# Patient Record
Sex: FEMALE | Race: WHITE | Employment: OTHER | URBAN - METROPOLITAN AREA
[De-identification: names, ages, dates, MRNs, and addresses within clinical notes are randomized per-mention and may not be internally consistent; named-entity substitution may affect disease eponyms.]

---

## 2017-04-06 PROBLEM — I83.93 VARICOSE VEINS OF BOTH LOWER EXTREMITIES: Status: ACTIVE | Noted: 2017-04-06

## 2018-08-16 ENCOUNTER — HOSPITAL ENCOUNTER (OUTPATIENT)
Dept: CT IMAGING | Facility: CLINIC | Age: 68
Discharge: HOME | End: 2018-08-18
Payer: MEDICARE

## 2018-08-16 ENCOUNTER — HOSPITAL ENCOUNTER (OUTPATIENT)
Facility: CLINIC | Age: 68
Discharge: HOME | End: 2018-08-16
Payer: MEDICARE

## 2018-08-16 DIAGNOSIS — R19.8 CHANGE IN BOWEL MOVEMENT: ICD-10-CM

## 2018-08-16 DIAGNOSIS — R10.32 COLICKY LLQ ABDOMINAL PAIN: ICD-10-CM

## 2018-08-16 DIAGNOSIS — Z01.812 PRE-PROCEDURAL LABORATORY EXAMINATIONS: ICD-10-CM

## 2018-08-16 LAB
BUN BLDV-MCNC: 11 MG/DL (ref 8–23)
CREAT SERPL-MCNC: 0.5 MG/DL (ref 0.5–0.9)
GFR AFRICAN AMERICAN: >60 ML/MIN
GFR NON-AFRICAN AMERICAN: >60 ML/MIN
GFR SERPL CREATININE-BSD FRML MDRD: NORMAL ML/MIN/{1.73_M2}
GFR SERPL CREATININE-BSD FRML MDRD: NORMAL ML/MIN/{1.73_M2}

## 2018-08-16 PROCEDURE — 74177 CT ABD & PELVIS W/CONTRAST: CPT

## 2018-08-16 PROCEDURE — 84520 ASSAY OF UREA NITROGEN: CPT

## 2018-08-16 PROCEDURE — 6360000004 HC RX CONTRAST MEDICATION: Performed by: FAMILY MEDICINE

## 2018-08-16 PROCEDURE — 82565 ASSAY OF CREATININE: CPT

## 2018-08-16 PROCEDURE — 2580000003 HC RX 258: Performed by: FAMILY MEDICINE

## 2018-08-16 RX ORDER — SODIUM CHLORIDE 0.9 % (FLUSH) 0.9 %
10 SYRINGE (ML) INJECTION PRN
Status: DISCONTINUED | OUTPATIENT
Start: 2018-08-16 | End: 2018-08-19 | Stop reason: HOSPADM

## 2018-08-16 RX ORDER — 0.9 % SODIUM CHLORIDE 0.9 %
70 INTRAVENOUS SOLUTION INTRAVENOUS ONCE
Status: COMPLETED | OUTPATIENT
Start: 2018-08-16 | End: 2018-08-16

## 2018-08-16 RX ADMIN — Medication 10 ML: at 15:35

## 2018-08-16 RX ADMIN — IOHEXOL 50 ML: 240 INJECTION, SOLUTION INTRATHECAL; INTRAVASCULAR; INTRAVENOUS; ORAL at 14:05

## 2018-08-16 RX ADMIN — SODIUM CHLORIDE 70 ML: 9 INJECTION, SOLUTION INTRAVENOUS at 15:34

## 2018-08-16 RX ADMIN — IOPAMIDOL 75 ML: 755 INJECTION, SOLUTION INTRAVENOUS at 15:33

## 2018-09-02 ENCOUNTER — HOSPITAL ENCOUNTER (EMERGENCY)
Facility: CLINIC | Age: 68
Discharge: HOME | End: 2018-09-02
Attending: EMERGENCY MEDICINE
Payer: OTHER MISCELLANEOUS

## 2018-09-02 VITALS
SYSTOLIC BLOOD PRESSURE: 139 MMHG | WEIGHT: 145 LBS | DIASTOLIC BLOOD PRESSURE: 61 MMHG | OXYGEN SATURATION: 99 % | HEART RATE: 57 BPM | HEIGHT: 65 IN | BODY MASS INDEX: 24.16 KG/M2 | RESPIRATION RATE: 21 BRPM | TEMPERATURE: 97.8 F
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
## 2018-09-02 PROCEDURE — 99283 EMERGENCY DEPT VISIT LOW MDM: CPT

## 2018-09-02 RX ORDER — CYCLOBENZAPRINE HCL 10 MG
10 TABLET ORAL 3 TIMES DAILY PRN
Qty: 20 TABLET | Refills: 0 | Status: SHIPPED | OUTPATIENT
Start: 2018-09-02 | End: 2018-09-12

## 2018-09-02 RX ORDER — IBUPROFEN 600 MG/1
600 TABLET ORAL EVERY 8 HOURS PRN
Qty: 20 TABLET | Refills: 0 | Status: SHIPPED | OUTPATIENT
Start: 2018-09-02 | End: 2018-12-10 | Stop reason: ALTCHOICE

## 2018-09-02 ASSESSMENT — ENCOUNTER SYMPTOMS
BACK PAIN: 0
DIARRHEA: 0
EYE PAIN: 0
NAUSEA: 0
ABDOMINAL PAIN: 0
VOMITING: 0
BLOOD IN STOOL: 0
COUGH: 0
CONSTIPATION: 0
SHORTNESS OF BREATH: 0

## 2018-09-02 ASSESSMENT — PAIN DESCRIPTION - LOCATION: LOCATION: NECK

## 2018-09-02 ASSESSMENT — PAIN DESCRIPTION - PROGRESSION: CLINICAL_PROGRESSION: NOT CHANGED

## 2018-09-02 ASSESSMENT — PAIN DESCRIPTION - ONSET: ONSET: SUDDEN

## 2018-09-02 ASSESSMENT — PAIN SCALES - GENERAL: PAINLEVEL_OUTOF10: 3

## 2018-09-02 ASSESSMENT — PAIN DESCRIPTION - ORIENTATION: ORIENTATION: ANTERIOR

## 2018-09-02 ASSESSMENT — PAIN DESCRIPTION - DESCRIPTORS: DESCRIPTORS: TIGHTNESS

## 2018-09-02 ASSESSMENT — PAIN DESCRIPTION - FREQUENCY: FREQUENCY: CONTINUOUS

## 2018-09-02 ASSESSMENT — PAIN DESCRIPTION - PAIN TYPE: TYPE: ACUTE PAIN
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
## 2018-10-16 ENCOUNTER — HOSPITAL ENCOUNTER (OUTPATIENT)
Dept: MAMMOGRAPHY | Facility: CLINIC | Age: 68
Discharge: HOME | End: 2018-10-18
Payer: MEDICARE

## 2018-10-16 DIAGNOSIS — Z12.31 ENCOUNTER FOR SCREENING MAMMOGRAM FOR MALIGNANT NEOPLASM OF BREAST: ICD-10-CM

## 2018-10-16 DIAGNOSIS — Z78.0 ASYMPTOMATIC MENOPAUSAL STATE: ICD-10-CM

## 2018-10-16 PROCEDURE — 77080 DXA BONE DENSITY AXIAL: CPT

## 2018-10-16 PROCEDURE — 77067 SCR MAMMO BI INCL CAD: CPT

## 2019-11-25 PROBLEM — M81.0 AGE-RELATED OSTEOPOROSIS WITHOUT CURRENT PATHOLOGICAL FRACTURE: Status: ACTIVE | Noted: 2019-11-25

## 2020-12-22 ENCOUNTER — OFFICE VISIT (OUTPATIENT)
Dept: PRIMARY CARE CLINIC | Age: 70
End: 2020-12-22
Payer: MEDICARE

## 2020-12-22 ENCOUNTER — HOSPITAL ENCOUNTER (OUTPATIENT)
Age: 70
Setting detail: SPECIMEN
Discharge: HOME | End: 2020-12-22
Payer: MEDICARE

## 2020-12-22 VITALS
SYSTOLIC BLOOD PRESSURE: 129 MMHG | BODY MASS INDEX: 21.66 KG/M2 | DIASTOLIC BLOOD PRESSURE: 71 MMHG | HEIGHT: 65 IN | WEIGHT: 130 LBS | HEART RATE: 70 BPM | TEMPERATURE: 98 F | OXYGEN SATURATION: 99 %

## 2020-12-22 PROCEDURE — 1036F TOBACCO NON-USER: CPT | Performed by: INTERNAL MEDICINE

## 2020-12-22 PROCEDURE — 1090F PRES/ABSN URINE INCON ASSESS: CPT | Performed by: INTERNAL MEDICINE

## 2020-12-22 PROCEDURE — 4040F PNEUMOC VAC/ADMIN/RCVD: CPT | Performed by: INTERNAL MEDICINE

## 2020-12-22 PROCEDURE — G8399 PT W/DXA RESULTS DOCUMENT: HCPCS | Performed by: INTERNAL MEDICINE

## 2020-12-22 PROCEDURE — G8420 CALC BMI NORM PARAMETERS: HCPCS | Performed by: INTERNAL MEDICINE

## 2020-12-22 PROCEDURE — 99202 OFFICE O/P NEW SF 15 MIN: CPT | Performed by: INTERNAL MEDICINE

## 2020-12-22 PROCEDURE — 1123F ACP DISCUSS/DSCN MKR DOCD: CPT | Performed by: INTERNAL MEDICINE

## 2020-12-22 PROCEDURE — 3017F COLORECTAL CA SCREEN DOC REV: CPT | Performed by: INTERNAL MEDICINE

## 2020-12-22 PROCEDURE — G8427 DOCREV CUR MEDS BY ELIG CLIN: HCPCS | Performed by: INTERNAL MEDICINE

## 2020-12-22 PROCEDURE — G8484 FLU IMMUNIZE NO ADMIN: HCPCS | Performed by: INTERNAL MEDICINE

## 2020-12-22 ASSESSMENT — ENCOUNTER SYMPTOMS: FLU SYMPTOMS: 1

## 2020-12-22 NOTE — PROGRESS NOTES
[address] [address]  Dept: [phone]  Dept Fax: [phone]    Kush Garcia is a 79 y.o. female who presents to the urgent care today for her medicalconditions/complaints as noted below. Kush Garcia is c/o of Headache (onset 4 days, temple area off and on), Sinus Problem (onset 4 days), Fatigue (onset 4 days), Nasal Congestion (onset 4 days runny nose and post nasal drainage), and Cough (onset 4 days, worse at night)      HPI:     Influenza  This is a new problem. The current episode started in the past 7 days. The problem occurs constantly. The problem has been unchanged. Associated symptoms include congestion and headaches. Nothing aggravates the symptoms. She has tried nothing for the symptoms. The treatment provided no relief. Past Medical History:   Diagnosis Date    Carpal tunnel syndrome     Recurrent herpes simplex virus (HSV) infection of buttock     Varicose veins of both lower extremities 4/6/2017        Current Outpatient Medications   Medication Sig Dispense Refill    alendronate (FOSAMAX) 70 MG tablet Take 1 tablet by mouth every 7 days 12 tablet 3    valACYclovir (VALTREX) 1 g tablet TAKE 1 TABLET BY MOUTH TWICE A DAY FOR 3 DAYS FOR OUTBREAK (Patient not taking: Reported on 12/22/2020) 24 tablet 1     No current facility-administered medications for this visit.       No Known Allergies    Health Maintenance   Topic Date Due    Breast cancer screen  10/16/2020    Lipid screen  02/26/2021    DTaP/Tdap/Td vaccine (3 - Td) 10/08/2021    Colon cancer screen colonoscopy  09/19/2028    DEXA (modify frequency per FRAX score)  Completed    Flu vaccine  Completed    Shingles Vaccine  Completed    Pneumococcal 65+ years Vaccine  Completed    Hepatitis C screen  Completed    Hepatitis A vaccine  Aged Out    Hepatitis B vaccine  Aged Out    Hib vaccine  Aged Out    Meningococcal (ACWY) vaccine  Aged Out Subjective:      Review of Systems   HENT: Positive for congestion. Neurological: Positive for headaches. All other systems reviewed and are negative. Objective:     Physical Exam  Constitutional:       Appearance: Normal appearance. HENT:      Head: Normocephalic and atraumatic. Skin:     General: Skin is warm and dry. Neurological:      General: No focal deficit present. Mental Status: She is alert and oriented to person, place, and time. Psychiatric:         Mood and Affect: Mood normal.         Behavior: Behavior normal.       /71 (Site: Left Upper Arm, Position: Sitting, Cuff Size: Medium Adult)   Pulse 70   Temp 98 °F (36.7 °C) (Oral)   Ht 5' 5\" (1.651 m)   Wt 130 lb (59 kg)   SpO2 99%   BMI 21.63 kg/m²       Assessment:       Diagnosis Orders   1. Flu-like symptoms  COVID-19 Ambulatory       Plan:      No follow-ups on file. No orders of the defined types were placed in this encounter. Orders Placed This Encounter   Procedures    COVID-19 Ambulatory     Standing Status:   Future     Standing Expiration Date:   12/22/2021     Scheduling Instructions:      Saline media preferred given current shortage of viral transport media but both acceptable     Order Specific Question:   Is this test for diagnosis or screening? Answer:   Diagnosis of ill patient     Order Specific Question:   Symptomatic for COVID-19 as defined by CDC? Answer:   Yes     Order Specific Question:   Date of Symptom Onset     Answer:   12/14/2020     Order Specific Question:   Hospitalized for COVID-19? Answer:   No     Order Specific Question:   Admitted to ICU for COVID-19? Answer:   No     Order Specific Question:   Employed in healthcare setting? Answer:   No     Order Specific Question:   Resident in a congregate (group) care setting? Answer:   No     Order Specific Question:   Pregnant?      Answer:   No            Patient given educational materials - see patient instructions. Discussed use, benefit, and side effects of prescribed medications. All patientquestions answered. Pt voiced understanding.     Electronically signed by Ayanna Cai MD on 12/22/2020at 10:39 AM

## 2020-12-25 LAB — SARS-COV-2, NAA: DETECTED

## 2020-12-26 ENCOUNTER — TELEPHONE (OUTPATIENT)
Dept: PRIMARY CARE CLINIC | Age: 70
End: 2020-12-26

## 2021-01-13 ENCOUNTER — HOSPITAL ENCOUNTER (OUTPATIENT)
Dept: MAMMOGRAPHY | Facility: CLINIC | Age: 71
Discharge: HOME | End: 2021-01-15
Payer: MEDICARE

## 2021-01-13 DIAGNOSIS — M81.0 OSTEOPOROSIS, UNSPECIFIED OSTEOPOROSIS TYPE, UNSPECIFIED PATHOLOGICAL FRACTURE PRESENCE: ICD-10-CM

## 2021-01-13 DIAGNOSIS — Z12.31 ENCOUNTER FOR SCREENING MAMMOGRAM FOR BREAST CANCER: ICD-10-CM

## 2021-01-13 PROCEDURE — 77067 SCR MAMMO BI INCL CAD: CPT

## 2021-01-13 PROCEDURE — 77080 DXA BONE DENSITY AXIAL: CPT

## 2022-11-10 ENCOUNTER — APPOINTMENT (OUTPATIENT)
Dept: GENERAL RADIOLOGY | Facility: CLINIC | Age: 72
End: 2022-11-10
Payer: MEDICARE

## 2022-11-10 ENCOUNTER — HOSPITAL ENCOUNTER (EMERGENCY)
Facility: CLINIC | Age: 72
Discharge: HOME | End: 2022-11-10
Attending: EMERGENCY MEDICINE
Payer: MEDICARE

## 2022-11-10 VITALS
HEART RATE: 65 BPM | SYSTOLIC BLOOD PRESSURE: 153 MMHG | WEIGHT: 140 LBS | TEMPERATURE: 97.9 F | HEIGHT: 65 IN | OXYGEN SATURATION: 100 % | BODY MASS INDEX: 23.32 KG/M2 | RESPIRATION RATE: 16 BRPM | DIASTOLIC BLOOD PRESSURE: 71 MMHG

## 2022-11-10 DIAGNOSIS — S62.112A CLOSED CHIP FRACTURE OF TRIQUETRUM OF LEFT WRIST, INITIAL ENCOUNTER: Primary | ICD-10-CM

## 2022-11-10 PROCEDURE — 99283 EMERGENCY DEPT VISIT LOW MDM: CPT

## 2022-11-10 PROCEDURE — 73130 X-RAY EXAM OF HAND: CPT

## 2022-11-10 PROCEDURE — 73110 X-RAY EXAM OF WRIST: CPT

## 2022-11-10 ASSESSMENT — ENCOUNTER SYMPTOMS
BACK PAIN: 0
COLOR CHANGE: 1

## 2022-11-10 ASSESSMENT — PAIN DESCRIPTION - ORIENTATION: ORIENTATION: LEFT

## 2022-11-10 ASSESSMENT — PAIN SCALES - GENERAL: PAINLEVEL_OUTOF10: 6

## 2022-11-10 ASSESSMENT — PAIN DESCRIPTION - DESCRIPTORS: DESCRIPTORS: SORE

## 2022-11-10 ASSESSMENT — PAIN DESCRIPTION - ONSET: ONSET: SUDDEN

## 2022-11-10 ASSESSMENT — PAIN DESCRIPTION - PAIN TYPE: TYPE: ACUTE PAIN

## 2022-11-10 ASSESSMENT — PAIN DESCRIPTION - FREQUENCY: FREQUENCY: CONTINUOUS

## 2022-11-10 ASSESSMENT — PAIN DESCRIPTION - LOCATION: LOCATION: WRIST

## 2022-11-10 NOTE — DISCHARGE INSTRUCTIONS
PLEASE RETURN TO THE EMERGENCY DEPARTMENT IMMEDIATELY if your symptoms worsen in anyway or in 1-2 days if not improved for re-evaluation. You should immediately return to the ER for symptoms such as new or worsening pain, fever, numbness or weakness to the arms or legs, coolness or color change of the arms or legs. It is recommended that you keep your splint on until seen by your family doctor or an orthopedist.      Take your medication as indicated and prescribed. If you are given an antibiotic then, make sure you get the prescription filled and take the antibiotics until finished. Please understand that at this time there is no evidence for a more serious underlying process, but that early in the process of an illness or injury, an emergency department workup can be falsely reassuring. You should contact your family doctor within the next 48 hours for a follow up appointment    Vale Lechuga!!!    From [facility] and [facility] Emergency Services    On behalf of the Emergency Department staff at [facility]), I would like to thank you for giving us the opportunity to address your health care needs and concerns. We hope that during your visit, our service was delivered in a professional and caring manner. Please keep [facility] in mind as we walk with you down the path to your own personal wellness. Please expect an automated text message or email from us so we can ask a few questions about your health and progress. Based on your answers, a clinician may call you back to offer help and instructions. Please understand that early in the process of an illness or injury, an emergency department workup can be falsely reassuring. If you notice any worsening, changing or persistent symptoms please call your family doctor or return to the ER immediately. Tell us how we did during your visit at [email or web address]   and let us know about your experience

## 2022-11-10 NOTE — ED PROVIDER NOTES
Attending Supervisory Note/Shared Visit   I have personally performed a face to face diagnostic evaluation on this patient. I have reviewed the mid-levels findings and agree. History and Exam by me shows an alert and oriented patient seen with extender I agree with the assessment treatment plan and disposition  I discussed the findings patient she has a triquetrium avulsion fracture of her nondominant wrist.  Which is closed.     (Please note that portions of this note were completed with a voice recognition program.  Efforts were made to edit the dictations but occasionally words are mis-transcribed.)    Cindy Bagley MD  Attending Emergency Physician       Cindy Bagley MD  11/10/22 6140

## 2022-11-10 NOTE — ED PROVIDER NOTES
[facility]  [address] [facility]  Phone: [phone]        Pt Name: Mariam Warren  MRN: [id number]  [place] 1950  Date of evaluation: 11/10/22    CHIEFCOMPLAINT       Chief Complaint   Patient presents with    Fall    Wrist Injury     Left. Denies LOC or blood thinners. Denies hitting head or other injury. HISTORY OF PRESENT ILLNESS (Location/Symptom, Timing/Onset, Context/Setting, Quality, Duration, Modifying Factors, Severity)      Mariam Warren is a 67 y.o. female with no pertinent PMH who presents to the ED via private auto with left wrist pain. Patient states approximate 1030 this morning she tripped over her dog and fell onto her wrist.  She denies in her head or any loss of consciousness. She denies any neck or back pain. She does not take any new medications for symptoms. She states that nothing makes her symptoms better or worse. She does report some bruising and does have an ice pack applied on arrival.    PAST MEDICAL / SURGICAL / SOCIAL / FAMILY HISTORY     PMH:  has a past medical history of Carpal tunnel syndrome, Recurrent herpes simplex virus (HSV) infection of buttock, and Varicose veins of both lower extremities. Surgical History:  has a past surgical history that includes Carpal tunnel release (Right) and Vein Surgery. Social History:  reports that she quit smoking about 45 years ago. Her smoking use included cigarettes. She has a 0.20 pack-year smoking history. She has never used smokeless tobacco. She reports current alcohol use. She reports that she does not use drugs. Family History: She indicated that her mother is . She indicated that her father is . She indicated that her sister is . She indicated that her brother is . family history includes Diabetes in her father and mother; Heart Disease in her brother; Hypertension in her father;  Other (age of onset: 27) in her sister; Prostate Cancer in her brother. Psychiatric History: None    Allergies: Patient has no known allergies. Home Medications:   Prior to Admission medications    Medication Sig Start Date End Date Taking? Authorizing Provider   valACYclovir (VALTREX) 1 g tablet TAKE 1 TABLET BY MOUTH TWICE A DAY FOR 3 DAYS FOR OUTBREAK 7/26/21   Jaxson Trujillo, DO       REVIEW OF SYSTEMS  (2-9 systems for level 4, 10 ormore for level 5)      Review of Systems   Musculoskeletal:  Positive for joint swelling. Negative for back pain, neck pain and neck stiffness. Positive left wrist pain   Skin:  Positive for color change. Negative for wound. Neurological:  Negative for weakness and numbness. All other systems negative except as marked. PHYSICAL EXAM  (up to 7 for level 4, 8 or more for level 5)      INITIAL VITALS:  height is 5' 5\" (1.651 m) and weight is 63.5 kg (140 lb). Her oral temperature is 97.9 °F (36.6 °C). Her blood pressure is 153/71 (abnormal) and her pulse is 65. Her respiration is 16 and oxygen saturation is 100%. Vital signs reviewed. Physical Exam  Constitutional:       General: She is not in acute distress. Appearance: Normal appearance. She is not ill-appearing or toxic-appearing. HENT:      Head: Normocephalic and atraumatic. Cardiovascular:      Pulses:           Radial pulses are 2+ on the left side. Musculoskeletal:      Right elbow: Normal.      Left elbow: Normal.      Right forearm: Normal.      Left forearm: Normal.      Right wrist: Normal.      Left wrist: Swelling present. No tenderness, bony tenderness or snuff box tenderness. Normal range of motion. Normal pulse. Right hand: Normal. Normal capillary refill. Normal pulse. Left hand: Normal. Normal capillary refill. Normal pulse. Cervical back: Neck supple. No tenderness. Comments: Patients is neurovascularly intact. Able to give OK sign, thumbs, up, and spread fingers against resistance bilaterally.  Sensation intact to palmar aspect of index finger, webbing between fingers, and pinky finger bilaterally. Bilateral radial pulses 2+. No tenderness to the left hand or wrist.  There is ecchymosis to the dorsum of left wrist.  Range of motion is intact. Skin:     General: Skin is warm and dry. Capillary Refill: Capillary refill takes less than 2 seconds. Neurological:      Mental Status: She is alert. Psychiatric:         Mood and Affect: Mood normal.         Behavior: Behavior normal.         DIFFERENTIAL DIAGNOSIS / MDM     Plan obtain x-ray of left wrist and hand to rule out any bony abnormalities. Ice pack was applied on arrival.  X-rays show a probable triquetral avulsion fracture. Will place patient in a wrist splint and referred to orthopedics. Attending discharged this patient after discussing all labwork/imaging results that were finalized. Treatment plan and recommended follow-up discussed with them as well. PLAN (LABS / IMAGING / EKG):  Orders Placed This Encounter   Procedures    XR WRIST LEFT (MIN 3 VIEWS)    XR HAND LEFT (MIN 3 VIEWS)    [facility] Wrist Brace, Left       MEDICATIONS ORDERED:  No orders of the defined types were placed in this encounter. Controlled Substances Monitoring:     DIAGNOSTIC RESULTS     EKG: All EKG's are interpreted by the Emergency Department Physician who either signs or Co-signs this chart in the absenceof a cardiologist.        RADIOLOGY: All images are read by the radiologist and their interpretations are reviewed. XR WRIST LEFT (MIN 3 VIEWS)   Final Result   Probable avulsion fracture triquetrum         XR HAND LEFT (MIN 3 VIEWS)   Final Result   Probable triquetral avulsion fracture. No results found. LABS:  No results found for this visit on 11/10/22.     EMERGENCY DEPARTMENT COURSE           Vitals:    Vitals:    11/10/22 1549   BP: (!) 153/71   Pulse: 65   Resp: 16   Temp: 97.9 °F (36.6 °C)   TempSrc: Oral   SpO2: 100%   Weight: 63.5 kg (140 lb)   Height: 5' 5\" (1.651 m)     -------------------------  BP: (!) 153/71, Temp: 97.9 °F (36.6 °C), Heart Rate: 65, Resp: 16      RE-EVALUATION:  See ED Course notes above. CONSULTS:  None    PROCEDURES:  None    FINAL IMPRESSION      1. Closed chip fracture of triquetrum of left wrist, initial encounter          DISPOSITION / PLAN     CONDITION ON DISPOSITION:   Stable for discharge.      PATIENT REFERRED TO:  Luis Fernando Laguna, [address]  [address] [address] [phone]    Call in 1 day      [facility]  [address] [facility] [address]  [phone]    If symptoms worsen    Bee MD Annel  [address] [facility] [address] [phone]    Call in 1 day      DISCHARGE MEDICATIONS:  New Prescriptions    No medications on file       LAW Bustos - [place]   Emergency Medicine Nurse Practitioner    (Please note that portions of this note were completed with a voice recognition program.  Efforts were made to edit the dictations but occasionally words aremis-transcribed.)       LAW Bustos - CNP  11/10/22 2471

## 2022-11-14 ENCOUNTER — OFFICE VISIT (OUTPATIENT)
Dept: ORTHOPEDIC SURGERY | Age: 72
End: 2022-11-14
Payer: MEDICARE

## 2022-11-14 VITALS — HEIGHT: 65 IN | RESPIRATION RATE: 12 BRPM | BODY MASS INDEX: 23.32 KG/M2 | WEIGHT: 140 LBS

## 2022-11-14 DIAGNOSIS — S62.112A CLOSED CHIP FRACTURE OF TRIQUETRUM OF LEFT WRIST, INITIAL ENCOUNTER: Primary | ICD-10-CM

## 2022-11-14 PROCEDURE — G8427 DOCREV CUR MEDS BY ELIG CLIN: HCPCS | Performed by: PHYSICIAN ASSISTANT

## 2022-11-14 PROCEDURE — 99203 OFFICE O/P NEW LOW 30 MIN: CPT | Performed by: PHYSICIAN ASSISTANT

## 2022-11-14 PROCEDURE — 1090F PRES/ABSN URINE INCON ASSESS: CPT | Performed by: PHYSICIAN ASSISTANT

## 2022-11-14 PROCEDURE — G8399 PT W/DXA RESULTS DOCUMENT: HCPCS | Performed by: PHYSICIAN ASSISTANT

## 2022-11-14 PROCEDURE — 3017F COLORECTAL CA SCREEN DOC REV: CPT | Performed by: PHYSICIAN ASSISTANT

## 2022-11-14 PROCEDURE — 1123F ACP DISCUSS/DSCN MKR DOCD: CPT | Performed by: PHYSICIAN ASSISTANT

## 2022-11-14 PROCEDURE — G8484 FLU IMMUNIZE NO ADMIN: HCPCS | Performed by: PHYSICIAN ASSISTANT

## 2022-11-14 PROCEDURE — G8420 CALC BMI NORM PARAMETERS: HCPCS | Performed by: PHYSICIAN ASSISTANT

## 2022-11-14 PROCEDURE — 1036F TOBACCO NON-USER: CPT | Performed by: PHYSICIAN ASSISTANT

## 2022-11-14 ASSESSMENT — ENCOUNTER SYMPTOMS
RESPIRATORY NEGATIVE: 1
ABDOMINAL DISTENTION: 0
ABDOMINAL PAIN: 0
VOMITING: 0
COLOR CHANGE: 0
CHEST TIGHTNESS: 0
CONSTIPATION: 0
DIARRHEA: 0
COUGH: 0
NAUSEA: 0
APNEA: 0
SHORTNESS OF BREATH: 0

## 2022-11-14 NOTE — PROGRESS NOTES
[address] [place] [address] [facility]  [address] [place] [id number]  Dept: [phone]  Dept Fax: [phone]        Fracture Follow Up      Subjective:     Chief Complaint   Patient presents with    Wrist Pain     L wrist fx. ED f/u 11/11/22     HPI:     Initial visit:     Ky Ceballos is a 67y.o. year old right hand dominant female who presents to our office today regarding her left wrist fracture. The injury was caused by a fall over her dog. The date of injury was on 11/10/2022. Therefore, we are 4 days post injury. Patient went to [facility] ED for initial treatment which included x-ray and was given wrist brace. Patient has tried ibuprofen for the pain. She has osteopenia. ROS:     Review of Systems   Constitutional:  Positive for activity change. Negative for appetite change, fatigue and fever. Respiratory: Negative. Negative for apnea, cough, chest tightness and shortness of breath. Cardiovascular: Negative. Negative for chest pain, palpitations and leg swelling. Gastrointestinal:  Negative for abdominal distention, abdominal pain, constipation, diarrhea, nausea and vomiting. Genitourinary:  Negative for difficulty urinating, dysuria and hematuria. Musculoskeletal:  Positive for arthralgias. Negative for gait problem, joint swelling and myalgias. Skin:  Negative for color change and rash. Neurological:  Negative for dizziness, weakness, numbness and headaches. Psychiatric/Behavioral:  Negative for sleep disturbance. I have reviewed the CC, HPI, ROS, PMH, FHX, Social History, and if not present in this note, I have reviewed in the patient's chart. I agree with the documentation provided by other staff and have reviewed their documentation prior to providing my signature indicating agreement.   Vitals:   Resp 12   Ht 5' 5\" (1.651 m)   Wt 140 lb (63.5 kg)   BMI 23.30 kg/m²  Body mass index is 23.3 kg/m². Physical Examination:     Orthopedics:    GENERAL: Alert and oriented X3 in no acute distress. SKIN: Intact without lesions or ulcerations. Positive ecchymosis  NEURO: Musculoskeletal and axillary nerves intact to sensory and motor testing. VASC: Capillary refill is less than 3 seconds. Fracture:    LOCATION: Left wrist  SITE: Distal neurocirculatory status is intact. EXAM: Sensation is intact to light touch, there is full motor function of the extremity. PALP: fracture site is palpated with severe pain. TFCC pain to palpation  ROM: Painful range of motion  Assessment:     1. Closed chip fracture of triquetrum of left wrist, initial encounter      Procedures:    Procedure: no  Radiology:   XR WRIST LEFT (MIN 3 VIEWS)    Result Date: 11/10/2022  EXAMINATION: 3  XRAY VIEWS OF THE LEFT WRIST 11/10/2022 4:06 pm COMPARISON: None. HISTORY: ORDERING SYSTEM PROVIDED HISTORY: pain TECHNOLOGIST PROVIDED HISTORY: pain Reason for Exam: fall left wrist pain, top of wrist bruising FINDINGS: Prominent triquetral osteophyte or fragment on lateral projection dorsally. Linear cortical lucency suspected. Cortical margins otherwise intact. Carpal bones in normal alignment. Probable avulsion fracture triquetrum     XR HAND LEFT (MIN 3 VIEWS)    Result Date: 11/10/2022  EXAMINATION: THREE XRAY VIEWS OF THE LEFT HAND 11/10/2022 4:06 pm COMPARISON: None. HISTORY: ORDERING SYSTEM PROVIDED HISTORY: pain TECHNOLOGIST PROVIDED HISTORY: pain Reason for Exam: fall left wrist pain , bruising top of wrist FINDINGS: Probable triquetral avulsion fracture. Otherwise negative hand. Probable triquetral avulsion fracture. Plan:   Fracture Treatment : I reviewed the x-ray of the hand and the wrist with the patient and I informed them that the x-ray shows a probable triquetral avulsion fracture.  We discussed the etiologies and natural histories of triquetral avulsion fracture of the left wrist. We discussed the various treatment alternatives including anti-inflammatory medications, physical therapy, injections, further imaging studies and as a last result surgery. During today's visit, we discussed that she has a triquetrum fracture which does not require any type of surgery. She should wear the brace for the next 6 weeks but she can come into some gentle exercises. The patient will follow-up with me in 4 weeks with pre-clinic x-rays of her left wrist. The patient will call the office immediately with any problems. No orders of the defined types were placed in this encounter. No orders of the defined types were placed in this encounter. This note is created with the assistance of a speech recognition program.  While intending to generate a document that actually reflects the content of the visit, the document can still have some errors including those of syntax and sound a like substitutions which may escape proof reading.   In such instances, actual meaning can be extrapolated by contextual diversion     Electronically signed by Alpesh Watkins PA-C, on 11/14/2022 at 11:34 AM

## 2022-12-12 DIAGNOSIS — S62.112A CLOSED CHIP FRACTURE OF TRIQUETRUM OF LEFT WRIST, INITIAL ENCOUNTER: Primary | ICD-10-CM

## 2022-12-14 ENCOUNTER — OFFICE VISIT (OUTPATIENT)
Dept: ORTHOPEDIC SURGERY | Age: 72
End: 2022-12-14
Payer: MEDICARE

## 2022-12-14 VITALS — WEIGHT: 140 LBS | BODY MASS INDEX: 23.32 KG/M2 | HEIGHT: 65 IN | RESPIRATION RATE: 12 BRPM

## 2022-12-14 DIAGNOSIS — S62.112D CLOSED CHIP FRACTURE OF LEFT TRIQUETRUM WITH ROUTINE HEALING, SUBSEQUENT ENCOUNTER: Primary | ICD-10-CM

## 2022-12-14 PROCEDURE — 1123F ACP DISCUSS/DSCN MKR DOCD: CPT | Performed by: PHYSICIAN ASSISTANT

## 2022-12-14 PROCEDURE — 1090F PRES/ABSN URINE INCON ASSESS: CPT | Performed by: PHYSICIAN ASSISTANT

## 2022-12-14 PROCEDURE — G8427 DOCREV CUR MEDS BY ELIG CLIN: HCPCS | Performed by: PHYSICIAN ASSISTANT

## 2022-12-14 PROCEDURE — 99212 OFFICE O/P EST SF 10 MIN: CPT | Performed by: PHYSICIAN ASSISTANT

## 2022-12-14 PROCEDURE — 3017F COLORECTAL CA SCREEN DOC REV: CPT | Performed by: PHYSICIAN ASSISTANT

## 2022-12-14 PROCEDURE — G8420 CALC BMI NORM PARAMETERS: HCPCS | Performed by: PHYSICIAN ASSISTANT

## 2022-12-14 PROCEDURE — G8484 FLU IMMUNIZE NO ADMIN: HCPCS | Performed by: PHYSICIAN ASSISTANT

## 2022-12-14 PROCEDURE — 1036F TOBACCO NON-USER: CPT | Performed by: PHYSICIAN ASSISTANT

## 2022-12-14 PROCEDURE — G8399 PT W/DXA RESULTS DOCUMENT: HCPCS | Performed by: PHYSICIAN ASSISTANT

## 2022-12-14 ASSESSMENT — ENCOUNTER SYMPTOMS
ABDOMINAL PAIN: 0
COUGH: 0
RESPIRATORY NEGATIVE: 1
DIARRHEA: 0
ABDOMINAL DISTENTION: 0
CONSTIPATION: 0
APNEA: 0
COLOR CHANGE: 0
SHORTNESS OF BREATH: 0
VOMITING: 0
NAUSEA: 0
CHEST TIGHTNESS: 0

## 2022-12-14 NOTE — PROGRESS NOTES
[address] AND SPORTS MEDICINE  [address]  [facility] [place] [address]  Dept: [phone]  Dept Fax: [phone]        Fracture follow up      Subjective:     Chief Complaint   Patient presents with    Wrist Pain     L wrist fx doi 11/11/22     HPI:   Follow up visit:     Rahel Baca is a 67y.o. year old female who presents to our office today for a followup regarding her left wrist fracture. The date of injury was on 11/10/2022. Therefore, we are 6 weeks post injury. Patient has tried nothing for the pain. She states that the pain is much improved. She does periodically put the splint on but overall she does not need it most of the time. Certain things do not bother her make it ache once in a while. ROS:     Review of Systems   Constitutional:  Positive for activity change. Negative for appetite change, fatigue and fever. Respiratory: Negative. Negative for apnea, cough, chest tightness and shortness of breath. Cardiovascular: Negative. Negative for chest pain, palpitations and leg swelling. Gastrointestinal:  Negative for abdominal distention, abdominal pain, constipation, diarrhea, nausea and vomiting. Genitourinary:  Negative for difficulty urinating, dysuria and hematuria. Musculoskeletal:  Positive for arthralgias. Negative for gait problem, joint swelling and myalgias. Skin:  Negative for color change and rash. Neurological:  Negative for dizziness, weakness, numbness and headaches. Psychiatric/Behavioral:  Negative for sleep disturbance. I have reviewed the CC, HPI, ROS, PMH, FHX, Social History, and if not present in this note, I have reviewed in the patient's chart. I agree with the documentation provided by other staff and have reviewed their documentation prior to providing my signature indicating agreement.   Vitals:   Resp 12   Ht 5' 5\" (1.651 m)   Wt 140 lb (63.5 kg) BMI 23.30 kg/m²  Body mass index is 23.3 kg/m². Physical Examination:     Orthopedics:    GENERAL: Alert and oriented X3 in no acute distress. SKIN: Intact without lesions or ulcerations. NEURO: Musculoskeletal and axillary nerves intact to sensory and motor testing. VASC: Capillary refill is less than 3 seconds. Fracture:    LOCATION: Left wrist  SITE: Distal neurocirculatory status is intact. EXAM: Sensation is intact to light touch, there is full motor function of the extremity. PALP: fracture site is palpated with minimal pain. ROM: 45 degrees of flexion, 45 of extension, 90 degrees of supination and 90 degrees pronation  Assessment:     1. Closed chip fracture of left triquetrum with routine healing, subsequent encounter      Procedures:    Procedure: no  Radiology:   3 views of the left wrist including AP, lateral and oblique views reveal triquetral fracture fragment on the lateral projection dorsally. Lateral cortical lucency noted. Cortical margins otherwise intact. Carpal bones are normal in alignment. Impression: Avulsion fracture of the triquetrum of the left wrist.    Plan:   Fracture Treatment : I reviewed the x-ray with the patient and I informed them that the x-ray does not show a lot of healing but the patient is feeling much better. We discussed the etiologies and natural histories of triquetral fracture of the left wrist. We discussed the various treatment alternatives including anti-inflammatory medications, physical therapy, injections, further imaging studies and as a last result surgery. During today's visit, we discussed that this fracture may never heal all the way back to bone but she will continue to feel better over time. There are no restrictions to her wrist at this time. She does not need to wear the brace anymore. I am going to include some exercises for the stiffness to help with that.  The patient has opted for discontinuing her brace and using the exercises to help with stiffness. Even if she does something painful, she is not injuring the fracture. The patient will follow-up with me in 6 weeks with pre-clinic x-rays. The patient will call the office immediately with any problems. No orders of the defined types were placed in this encounter. No orders of the defined types were placed in this encounter. This note is created with the assistance of a speech recognition program.  While intending to generate a document that actually reflects the content of the visit, the document can still have some errors including those of syntax and sound a like substitutions which may escape proof reading.   In such instances, actual meaning can be extrapolated by contextual diversion     Electronically signed by [place] PA-C, on 12/14/2022 at 9:09 AM

## 2023-01-23 DIAGNOSIS — S62.112D CLOSED CHIP FRACTURE OF LEFT TRIQUETRUM WITH ROUTINE HEALING, SUBSEQUENT ENCOUNTER: Primary | ICD-10-CM

## 2023-02-02 ENCOUNTER — OFFICE VISIT (OUTPATIENT)
Dept: ORTHOPEDIC SURGERY | Age: 73
End: 2023-02-02
Payer: MEDICARE

## 2023-02-02 VITALS — RESPIRATION RATE: 12 BRPM | HEIGHT: 65 IN | BODY MASS INDEX: 23.32 KG/M2 | WEIGHT: 140 LBS

## 2023-02-02 DIAGNOSIS — S62.112D CLOSED CHIP FRACTURE OF LEFT TRIQUETRUM WITH ROUTINE HEALING, SUBSEQUENT ENCOUNTER: Primary | ICD-10-CM

## 2023-02-02 PROCEDURE — 3017F COLORECTAL CA SCREEN DOC REV: CPT | Performed by: PHYSICIAN ASSISTANT

## 2023-02-02 PROCEDURE — G8399 PT W/DXA RESULTS DOCUMENT: HCPCS | Performed by: PHYSICIAN ASSISTANT

## 2023-02-02 PROCEDURE — 1123F ACP DISCUSS/DSCN MKR DOCD: CPT | Performed by: PHYSICIAN ASSISTANT

## 2023-02-02 PROCEDURE — G8484 FLU IMMUNIZE NO ADMIN: HCPCS | Performed by: PHYSICIAN ASSISTANT

## 2023-02-02 PROCEDURE — G8420 CALC BMI NORM PARAMETERS: HCPCS | Performed by: PHYSICIAN ASSISTANT

## 2023-02-02 PROCEDURE — 1036F TOBACCO NON-USER: CPT | Performed by: PHYSICIAN ASSISTANT

## 2023-02-02 PROCEDURE — 99212 OFFICE O/P EST SF 10 MIN: CPT | Performed by: PHYSICIAN ASSISTANT

## 2023-02-02 PROCEDURE — G8427 DOCREV CUR MEDS BY ELIG CLIN: HCPCS | Performed by: PHYSICIAN ASSISTANT

## 2023-02-02 PROCEDURE — 1090F PRES/ABSN URINE INCON ASSESS: CPT | Performed by: PHYSICIAN ASSISTANT

## 2023-02-02 ASSESSMENT — ENCOUNTER SYMPTOMS
VOMITING: 0
DIARRHEA: 0
NAUSEA: 0
CONSTIPATION: 0
ABDOMINAL DISTENTION: 0
SHORTNESS OF BREATH: 0
CHEST TIGHTNESS: 0
APNEA: 0
COLOR CHANGE: 0
RESPIRATORY NEGATIVE: 1
COUGH: 0
ABDOMINAL PAIN: 0

## 2023-02-02 NOTE — PROGRESS NOTES
[address]  AND SPORTS MEDICINE  [address]  [facility] [address]  Dept: [phone]  Dept Fax: [phone]        Fracture follow-up      Subjective:     Chief Complaint   Patient presents with    Wrist Pain     L wrist doi 11/11/22        HPI:     Follow up visit:     Ganesh Matta is a 67y.o. year old female who presents to our office today for a followup regarding her left wrist fracture. The date of injury was on 11/10/2022. Therefore, we are 12 weeks post injury. She states that her wrist is excellent and she is having no pain. She can do everything that she would like to do. ROS:     Review of Systems   Constitutional:  Positive for activity change. Negative for appetite change, fatigue and fever. Respiratory: Negative. Negative for apnea, cough, chest tightness and shortness of breath. Cardiovascular: Negative. Negative for chest pain, palpitations and leg swelling. Gastrointestinal:  Negative for abdominal distention, abdominal pain, constipation, diarrhea, nausea and vomiting. Genitourinary:  Negative for difficulty urinating, dysuria and hematuria. Musculoskeletal:  Negative for arthralgias, gait problem, joint swelling and myalgias. Skin:  Negative for color change and rash. Neurological:  Negative for dizziness, weakness, numbness and headaches. Psychiatric/Behavioral:  Negative for sleep disturbance. I have reviewed the CC, HPI, ROS, PMH, FHX, Social History, and if not present in this note, I have reviewed in the patient's chart. I agree with the documentation provided by other staff and have reviewed their documentation prior to providing my signature indicating agreement. Vitals:   Resp 12   Ht 5' 5\" (1.651 m)   Wt 140 lb (63.5 kg)   BMI 23.30 kg/m²  Body mass index is 23.3 kg/m².   Physical Examination:     Orthopedics:     GENERAL: Alert and oriented X3 in no acute distress. SKIN: Intact without lesions or ulcerations. NEURO: Musculoskeletal and axillary nerves intact to sensory and motor testing. VASC: Capillary refill is less than 3 seconds. Fracture:     LOCATION: Left wrist  SITE: Distal neurocirculatory status is intact. EXAM: Sensation is intact to light touch, there is full motor function of the extremity. PALP: fracture site is palpated with minimal pain. ROM: 45 degrees of flexion, 45 of extension, 90 degrees of supination and 90 degrees pronation  Assessment:     1. Closed chip fracture of left triquetrum with routine healing, subsequent encounter      Procedures:    Procedure: no  Radiology:   3 views of the left wrist including AP, lateral and oblique views reveal triquetral fracture fragment on the lateral projection dorsally. Lateral cortical lucency noted. Cortical margins otherwise intact. Carpal bones are normal in alignment. Progressive healing since previous x-ray on 12/14/2022. Impression: Avulsion fracture of the triquetrum of the left wrist.  Plan:   Fracture Treatment : I reviewed the X-ray with the patient and I informed them that the x-ray does show progressive healing. We discussed the etiologies and natural histories of left avulsion fracture of the triquetrum. We discussed the various treatment alternatives including anti-inflammatory medications, physical therapy, injections, further imaging studies and as a last result surgery. During today's visit, we discussed that her fracture is healing very nicely. She has really no pain. I did explain to her that after her fracture there can always be some swelling and tinges of pain periodically especially if you overuse. I be surprised if that happens as much with this fracture but it could. The patient will call if she has any questions or concerns. We did discuss her bone density and her last DEXA scan showed osteopenia and had improved from osteoporosis with Fosamax.   I did tell her she should follow-up with her PCP since it may be time for a new DEXA scan since it is a little over 2 years old.  The patient will call the office immediately with any problems.      No orders of the defined types were placed in this encounter.      No orders of the defined types were placed in this encounter.      This note is created with the assistance of a speech recognition program.  While intending to generate a document that actually reflects the content of the visit, the document can still have some errors including those of syntax and sound a like substitutions which may escape proof reading.  In such instances, actual meaning can be extrapolated by contextual diversion     Electronically signed by Linh Avina PA-C, on 2/2/2023 at 9:21 AM

## 2023-07-28 ENCOUNTER — HOSPITAL ENCOUNTER (OUTPATIENT)
Dept: GENERAL RADIOLOGY | Facility: CLINIC | Age: 73
End: 2023-07-28
Payer: MEDICARE

## 2023-07-28 DIAGNOSIS — M25.552 LEFT HIP PAIN: ICD-10-CM

## 2023-07-28 PROCEDURE — 73502 X-RAY EXAM HIP UNI 2-3 VIEWS: CPT

## 2023-08-24 ENCOUNTER — HOSPITAL ENCOUNTER (OUTPATIENT)
Dept: GENERAL RADIOLOGY | Facility: CLINIC | Age: 73
Discharge: HOME | End: 2023-08-26
Payer: MEDICARE

## 2023-08-24 DIAGNOSIS — R05.1 ACUTE COUGH: ICD-10-CM

## 2023-08-24 DIAGNOSIS — R07.81 RIB PAIN ON LEFT SIDE: ICD-10-CM

## 2023-08-24 PROCEDURE — 71046 X-RAY EXAM CHEST 2 VIEWS: CPT

## 2023-09-01 ENCOUNTER — HOSPITAL ENCOUNTER (OUTPATIENT)
Age: 73
Setting detail: SPECIMEN
Discharge: HOME | End: 2023-09-01

## 2023-09-01 DIAGNOSIS — R53.83 FATIGUE, UNSPECIFIED TYPE: ICD-10-CM

## 2023-09-01 LAB
ALBUMIN SERPL-MCNC: 4.4 G/DL (ref 3.5–5.2)
ALBUMIN/GLOB SERPL: 1.5 {RATIO} (ref 1–2.5)
ALP SERPL-CCNC: 84 U/L (ref 35–104)
ALT SERPL-CCNC: 22 U/L (ref 5–33)
ANION GAP SERPL CALCULATED.3IONS-SCNC: 12 MMOL/L (ref 9–17)
AST SERPL-CCNC: 29 U/L
BASOPHILS # BLD: 0.04 K/UL (ref 0–0.2)
BASOPHILS NFR BLD: 1 % (ref 0–2)
BILIRUB SERPL-MCNC: 0.3 MG/DL (ref 0.3–1.2)
BUN SERPL-MCNC: 17 MG/DL (ref 8–23)
CALCIUM SERPL-MCNC: 9.9 MG/DL (ref 8.6–10.4)
CHLORIDE SERPL-SCNC: 104 MMOL/L (ref 98–107)
CO2 SERPL-SCNC: 25 MMOL/L (ref 20–31)
CREAT SERPL-MCNC: 0.6 MG/DL (ref 0.5–0.9)
EOSINOPHIL # BLD: 0.26 K/UL (ref 0–0.44)
EOSINOPHILS RELATIVE PERCENT: 5 % (ref 1–4)
ERYTHROCYTE [DISTWIDTH] IN BLOOD BY AUTOMATED COUNT: 13.2 % (ref 11.8–14.4)
GFR SERPL CREATININE-BSD FRML MDRD: >60 ML/MIN/1.73M2
GLUCOSE P FAST SERPL-MCNC: 101 MG/DL (ref 70–99)
HCT VFR BLD AUTO: 41.9 % (ref 36.3–47.1)
HGB BLD-MCNC: 13.9 G/DL (ref 11.9–15.1)
IMM GRANULOCYTES # BLD AUTO: <0.03 K/UL (ref 0–0.3)
IMM GRANULOCYTES NFR BLD: 0 %
LYMPHOCYTES NFR BLD: 2.14 K/UL (ref 1.1–3.7)
LYMPHOCYTES RELATIVE PERCENT: 37 % (ref 24–43)
MCH RBC QN AUTO: 29.3 PG (ref 25.2–33.5)
MCHC RBC AUTO-ENTMCNC: 33.2 G/DL (ref 28.4–34.8)
MCV RBC AUTO: 88.2 FL (ref 82.6–102.9)
MONOCYTES NFR BLD: 0.52 K/UL (ref 0.1–1.2)
MONOCYTES NFR BLD: 9 % (ref 3–12)
NEUTROPHILS NFR BLD: 48 % (ref 36–65)
NEUTS SEG NFR BLD: 2.83 K/UL (ref 1.5–8.1)
NRBC BLD-RTO: 0 PER 100 WBC
PLATELET # BLD AUTO: 300 K/UL (ref 138–453)
PMV BLD AUTO: 9.7 FL (ref 8.1–13.5)
POTASSIUM SERPL-SCNC: 4.6 MMOL/L (ref 3.7–5.3)
PROT SERPL-MCNC: 7.4 G/DL (ref 6.4–8.3)
RBC # BLD AUTO: 4.75 M/UL (ref 3.95–5.11)
SODIUM SERPL-SCNC: 141 MMOL/L (ref 135–144)
TSH SERPL DL<=0.05 MIU/L-ACNC: 3.34 UIU/ML (ref 0.3–5)
WBC OTHER # BLD: 5.8 K/UL (ref 3.5–11.3)

## 2023-09-06 LAB
EST. AVERAGE GLUCOSE BLD GHB EST-MCNC: 105 MG/DL
HBA1C MFR BLD: 5.3 % (ref 4–6)

## 2023-09-20 ENCOUNTER — HOSPITAL ENCOUNTER (OUTPATIENT)
Dept: MRI IMAGING | Facility: CLINIC | Age: 73
Discharge: HOME | End: 2023-09-22
Payer: MEDICARE

## 2023-09-20 DIAGNOSIS — R93.7 ABNORMAL X-RAY OF BONE: ICD-10-CM

## 2023-09-20 DIAGNOSIS — M25.552 LEFT HIP PAIN: ICD-10-CM

## 2023-09-20 PROCEDURE — 73721 MRI JNT OF LWR EXTRE W/O DYE: CPT

## 2023-09-25 NOTE — PROGRESS NOTES
[address] [facility] SPORTS MEDICINE  [address] [place] [address]  Dept: [phone]  Dept Fax: [phone]        Left Hip Office Visit    Subjective:   No chief complaint on file. HPI:     Negrito Aggarwal presents with a *** history of pain in the {Left/right:64385} hip. The pain {DOES/DOES NOT:07010} radiate into the thigh and into the groin. The pain is {Blank multiple:57583::\"not present\",\"present\"} over the lateral aspect of the hip. The pain is most troublesome during ambulatory activity and now limits the patient`s walking distance to ***. Night pain, which disturbs the patient`s sleep {IS/IS NOT:04118} a problem. The patient has had to give up some activities such as ***, which has produced a consequent deterioration in quality of life. she has tried *** and reduction of activity and reports {Desc; no/some/marked:63128} improvement. Back pain is {Blank multiple:69379::\"not present\",\"present\"} but is tolerable and {DOES/DOES NOT:16949} not interfere with the patient`s life as does the hip. The patient {HAS HAS DVQ:14628} had a cortisone injection. The patient {HAS HAS KTL:46857} tried physical therapy. The patient {HAS HAS YWK:27222} had surgery. The opposite hip {IS/IS HNN:05275} okay. Knee pain is {MINIMALLY/MODERATELY/MARKEDLY HIP FRACTURE HPI MD:60986} noted. Patient had an MRI of her left hip completed.     ROS:     Review of Systems    Past Medical History:    Past Medical History:   Diagnosis Date    Carpal tunnel syndrome     Recurrent herpes simplex virus (HSV) infection of buttock     Varicose veins of both lower extremities 4/6/2017       Past Surgical History:    Past Surgical History:   Procedure Laterality Date    CARPAL TUNNEL RELEASE Right     VEIN SURGERY      just sclerotherapy with Dr Praful Sloan every winter       CurrentMedications:   Current Outpatient Medications   Medication Sig Dispense

## 2023-09-26 ENCOUNTER — OFFICE VISIT (OUTPATIENT)
Dept: ORTHOPEDIC SURGERY | Age: 73
End: 2023-09-26
Payer: MEDICARE

## 2023-09-26 VITALS — BODY MASS INDEX: 24.16 KG/M2 | RESPIRATION RATE: 16 BRPM | OXYGEN SATURATION: 98 % | HEIGHT: 65 IN | WEIGHT: 145 LBS

## 2023-09-26 DIAGNOSIS — S76.012A TEAR OF LEFT GLUTEUS MEDIUS TENDON, INITIAL ENCOUNTER: Primary | ICD-10-CM

## 2023-09-26 PROCEDURE — 99214 OFFICE O/P EST MOD 30 MIN: CPT | Performed by: PHYSICIAN ASSISTANT

## 2023-09-26 PROCEDURE — 1123F ACP DISCUSS/DSCN MKR DOCD: CPT | Performed by: PHYSICIAN ASSISTANT

## 2023-09-26 ASSESSMENT — ENCOUNTER SYMPTOMS
CHEST TIGHTNESS: 0
DIARRHEA: 0
COUGH: 0
APNEA: 0
ABDOMINAL PAIN: 0
NAUSEA: 0
BACK PAIN: 0
CONSTIPATION: 0
SHORTNESS OF BREATH: 0

## 2023-09-26 NOTE — PROGRESS NOTES
[address] [facility] SPORTS MEDICINE  [address] [place] [address]  Dept: [phone]  Dept Fax: [phone]        Left Hip Office Visit, Established Patient New Problem    Subjective:     Chief Complaint   Patient presents with    Hip Pain     Left Hip pain- EP/NP       HPI:     Riaz Ritter presents with a 10 month history of pain in the left hip. She saw her PCP, Dr. Bianca Gonsalez, on 7-27-23 for her left hip pain, where she received her referral to here and a physical therapy prescription and x-ray order. The pain does radiate into the thigh but not into the groin. The pain is present over the lateral aspect of the hip. The pain is most troublesome during ambulatory activity and now limits the patient`s walking distance to 1 mile. Night pain, which disturbs the patient`s sleep is somewhat a problem. The patient has had to give up some activities such as exercising, which has produced a consequent deterioration in quality of life. she has tried rest, ibuprofen and reduction of activity and reports some improvement. Back pain is not present. The patient has not had a cortisone injection. The patient has not tried physical therapy, she has been given a prescription for physical therapy but has yet to go. The patient has not had surgery. The opposite hip is  okay. Knee pain is not noted. Patient had an MRI of her left hip completed. ROS:     Review of Systems   Constitutional:  Positive for activity change. Negative for appetite change, fatigue and fever. HENT:  Negative for congestion. Respiratory: Negative. Negative for apnea, cough, chest tightness and shortness of breath. Cardiovascular: Negative. Negative for chest pain, palpitations and leg swelling. Gastrointestinal:  Negative for abdominal distention, abdominal pain, constipation, diarrhea, nausea and vomiting.    Genitourinary:  Negative for

## 2023-09-27 ASSESSMENT — ENCOUNTER SYMPTOMS
ABDOMINAL DISTENTION: 0
COLOR CHANGE: 0
RESPIRATORY NEGATIVE: 1
VOMITING: 0

## 2023-10-02 ENCOUNTER — HOSPITAL ENCOUNTER (OUTPATIENT)
Dept: PHYSICAL THERAPY | Facility: CLINIC | Age: 73
Setting detail: THERAPIES SERIES
Discharge: HOME | End: 2023-10-02
Payer: MEDICARE

## 2023-10-02 PROCEDURE — 97161 PT EVAL LOW COMPLEX 20 MIN: CPT

## 2023-10-02 PROCEDURE — 97110 THERAPEUTIC EXERCISES: CPT

## 2023-10-02 NOTE — CONSULTS
limitations and mobility  []  []  []     4. Independent with Home Exercise Programs []  []  []     5. Demonstrate knowledge of fall risk prevention  []  []  []      []  []  []     Date Addressed:        LTG: To be met in 12 treatments       1. Improve score on assessment tool Lower Extremity Functional Scale (LEFS) from 10% impairment to less than 8% impairment  []  []  []     2. Reduce pain levels to 0/10 or less with ADLs []  []  []     3. SLS for at least 10 seconds bilaterally to ease ADL's []  []  []                           Patient goals: walk without pain       Rehab Potential:  [x] Good  [] Fair  [] Poor   Suggested Professional Referral:  [x] No  [] Yes:  Barriers to Goal Achievement:  [x] No  [] Yes:  Domestic Concerns:  [x] No  [] Yes:    Pt. Education:  [x] Plans/Goals, Risks/Benefits discussed  [x] Home exercise program    Method of Education: [x] Verbal  [x] Demo  [x] Written    Access Code: [email or web address]  URL: [email or web address]  Date: 10/02/2023  Prepared by: Sandi Potts    Exercises  - Clamshell  - 1 x daily - 7 x weekly - 2 sets - 10 reps  - Supine Bridge  - 1 x daily - 7 x weekly - 2 sets - 10 reps  - Sidelying Hip Abduction  - 1 x daily - 7 x weekly - 2 sets - 10 reps  - Modified Brayden Stretch  - 1 x daily - 7 x weekly - 1 min hold  - Supine Quadriceps Stretch with Strap on Table  - 1 x daily - 7 x weekly - 3 sets - 30 sec hold    Comprehension of Education:  [x] Verbalizes understanding. [x] Demonstrates understanding. [x] Needs Review. [] Demonstrates/verbalizes understanding of HEP/Ed previously given.     Treatment Plan:  [x] Therapeutic Exercise   83440  [] Iontophoresis: 4 mg/mL Dexamethasone Sodium Phosphate  mAmin  18916   [x] Therapeutic Activity  71435 [] Vasopneumatic cold with compression  43718    [x] Gait Training   35619 [] Ultrasound   15237   [x] Neuromuscular Re-education  62610 [] Electrical Stimulation Unattended  04617   [x] Manual Therapy  61821 []

## 2023-10-04 ENCOUNTER — HOSPITAL ENCOUNTER (OUTPATIENT)
Dept: PHYSICAL THERAPY | Facility: CLINIC | Age: 73
Setting detail: THERAPIES SERIES
Discharge: HOME | End: 2023-10-04
Payer: MEDICARE

## 2023-10-04 PROCEDURE — 97110 THERAPEUTIC EXERCISES: CPT

## 2023-10-04 NOTE — FLOWSHEET NOTE
[] [address]  [address] [facility].  [phone]  F: [phone] [] 204 [facility]  [address]   [address]  P: [phone]  F: [phone] [] [address]  [address] [facility]  P: [phone]  F: [phone] [] [place]: [phone]  F: [phone] [x] 224 [facility]  One [facility]   [address]   P: [phone]  F: [phone]  [] 66[address] [facility].   P: [phone]  F: [phone] [] 205 [facility]  [address]  P: [phone]  F: [phone] [] 224 [facility]  [address] [facility]  [place]: [phone]  F: [phone] [] [address] [place] [facility] [address]  [place]: [phone]  F: [phone]      Physical Therapy Daily Treatment Note    Date:  10/4/2023  Patient Name:  Blake Mathur    :  1950  MRN: [id number]  Physician: HEIDI Faria             Insurance: [facility] Medicare ($74 copay, vs BMN, follow medicare guidelines)  Medical Diagnosis: Tear of left gluteus medius tendon, initial encounter (51-41-72-48 [ICD-10-CM])  Rehab Codes: M62.81, R26.89, M25.552, M25.652, R29.3  Onset date: referral date 23                             Next 's appt.: not scheduled  Visit# / total visits: ; Progress note for Medicare patient due at visit 10  Cancels/No Shows: 0    Subjective:    Pain:  [] Yes  [] No Location: left leg  Pain Rating: (0-10 scale) 0/10  Pain altered Tx:  [] No  [] Yes  Action:  Comments: Patient reports that her

## 2023-10-09 ENCOUNTER — HOSPITAL ENCOUNTER (OUTPATIENT)
Dept: PHYSICAL THERAPY | Facility: CLINIC | Age: 73
Setting detail: THERAPIES SERIES
Discharge: HOME | End: 2023-10-09
Payer: MEDICARE

## 2023-10-09 NOTE — CARE COORDINATION
[] [address]  4600 [facility].  [phone]  F: [phone] [] 204 [facility]  [address]   [address]  P: [phone]  F: [phone] [] [address]  151 [facility]  P: [phone]  F: [phone] [] [facility] [place]: [phone]  F: [phone] [x] 224 [facility]  One [facility]   [address]   P: [phone]  F: [phone]  [] [address] [facility].   P: [phone]  F: [phone] [] 205 [facility]  2000 [facility]   [address]  P: [phone]  F: [phone] [] 224 [facility]  [address] [place] [address]  [place]: [phone]  F: [phone] [] [address]  [place]: [phone]  F: [phone]      Therapy Cancel/No Show note    Date: 10/9/2023  Patient: Marleen Luong  : 1950  MRN: [id number]    Cancels/No Shows to date:     For today's appointment patient:    [x]  Cancelled    [] Rescheduled appointment    [] No-show     Reason given by patient:    [x]  Patient ill    []  Conflicting appointment    [] No transportation      [] Conflict with work    [] No reason given    [] Weather related    [] [id number]    [] Other:      Comments:        [x] Next appointment was confirmed    Electronically signed by: Mike Rick

## 2023-10-11 ENCOUNTER — HOSPITAL ENCOUNTER (OUTPATIENT)
Dept: PHYSICAL THERAPY | Facility: CLINIC | Age: 73
Setting detail: THERAPIES SERIES
Discharge: HOME | End: 2023-10-11
Payer: MEDICARE

## 2023-10-11 PROCEDURE — 97110 THERAPEUTIC EXERCISES: CPT

## 2023-10-11 NOTE — FLOWSHEET NOTE
- 1 x daily - 7 x weekly - 3 sets - 30 sec hold    Comprehension of Education:  [x] Verbalizes understanding. [x] Demonstrates understanding. [x] Needs review. [] Demonstrates/verbalizes HEP/Ed previously given. Plan: [x] Continue current frequency toward long and short term goals.             Time In: 1:00pm           Time Out: 1:40pm      Electronically signed by:  Juliana Fernandez PTA

## 2023-10-16 ENCOUNTER — HOSPITAL ENCOUNTER (OUTPATIENT)
Dept: PHYSICAL THERAPY | Facility: CLINIC | Age: 73
Setting detail: THERAPIES SERIES
Discharge: HOME | End: 2023-10-16
Payer: MEDICARE

## 2023-10-16 PROCEDURE — 97110 THERAPEUTIC EXERCISES: CPT

## 2023-10-16 NOTE — FLOWSHEET NOTE
[] [address]  4601 [facility].  [phone]  F: [phone] [] 204 [facility]  [address] [facility] [address]   [address]  P: [phone]  F: [phone] [] [address]  151 [facility]  P: [phone]  F: [phone] [] [place]: [phone]  F: [phone] [x] 224 [facility]  [address] [facility]   [address]   P: [phone]  F: [phone]  [] 20[address] [facility].   P: [phone]  F: [phone] [] [address]  [address] [place]    [address]  P: [phone]  F: [phone] [] 224 [facility]  [address] [facility]  [place]: [phone]  F: [phone] [] [facility] [address]  [place]: [phone]  F: [phone]      Physical Therapy Daily Treatment Note    Date:  10/16/2023  Patient Name:  Charu Taylor    :  1950  MRN: [id number]  Physician: HEIDI Powers             Insurance: [address] Medicare ($36 copay, vs BMN, follow medicare guidelines)  Medical Diagnosis: Tear of left gluteus medius tendon, initial encounter (51-41-72-48 [ICD-10-CM])  Rehab Codes: M62.81, R26.89, M25.552, M25.652, R29.3  Onset date: referral date 23                             Next 's appt.: not scheduled  Visit# / total visits: ; Progress note for Medicare patient due at visit 10  Cancels/No Shows: 1/0    Subjective:    Pain:  [] Yes  [] No Location: left leg  Pain Rating: (0-10 scale) 0/10  Pain altered Tx:  [] No  [] Yes  Action:  Comments: Patient reports that

## 2023-10-18 ENCOUNTER — HOSPITAL ENCOUNTER (OUTPATIENT)
Dept: PHYSICAL THERAPY | Facility: CLINIC | Age: 73
Setting detail: THERAPIES SERIES
Discharge: HOME | End: 2023-10-18
Payer: MEDICARE

## 2023-10-18 NOTE — CARE COORDINATION
[] [address]  4600 [facility].  [phone]  F: [phone] [] 204 [facility]  [address] [facility] [address] [facility] [address]   [address]  P: [phone]  F: [phone] [] [address]  151 [facility]  P: [phone]  F: [phone] [] [place]: [phone]  F: [phone] [x] 224 [facility]  One [facility]   [address]   P: [phone]  F: [phone]  [] [address] [facility].   P: [phone]  F: [phone] [] 205 [facility]  2000 [facility].   [address]  P: [phone]  F: [phone] [] 224 [facility]  7[address] [facility]  [place]: [phone]  F: [phone] [] [address]  [place]: [phone]  F: [phone]      Therapy Cancel/No Show note    Date: 10/18/2023  Patient: Viri Freeman  : 1950  MRN: [id number]    Cancels/No Shows to date:     For today's appointment patient:    [x]  Cancelled    [] Rescheduled appointment    [] No-show     Reason given by patient:    [x]  Patient ill    []  Conflicting appointment    [] No transportation      [] Conflict with work    [] No reason given    [] Weather related    [] [id number]    [] Other:      Comments:        [x] Next appointment was confirmed    Electronically signed by: Justin Michael PTA

## 2023-10-24 ENCOUNTER — APPOINTMENT (OUTPATIENT)
Dept: PHYSICAL THERAPY | Facility: CLINIC | Age: 73
End: 2023-10-24
Payer: MEDICARE

## 2023-10-26 ENCOUNTER — APPOINTMENT (OUTPATIENT)
Dept: PHYSICAL THERAPY | Facility: CLINIC | Age: 73
End: 2023-10-26
Payer: MEDICARE

## 2023-10-31 ENCOUNTER — APPOINTMENT (OUTPATIENT)
Dept: PHYSICAL THERAPY | Facility: CLINIC | Age: 73
End: 2023-10-31
Payer: MEDICARE